# Patient Record
Sex: FEMALE | Race: BLACK OR AFRICAN AMERICAN | NOT HISPANIC OR LATINO | Employment: UNEMPLOYED | URBAN - METROPOLITAN AREA
[De-identification: names, ages, dates, MRNs, and addresses within clinical notes are randomized per-mention and may not be internally consistent; named-entity substitution may affect disease eponyms.]

---

## 2020-05-14 ENCOUNTER — HOSPITAL ENCOUNTER (EMERGENCY)
Facility: HOSPITAL | Age: 50
Discharge: HOME | End: 2020-05-14
Payer: COMMERCIAL

## 2020-05-14 VITALS
WEIGHT: 293 LBS | HEART RATE: 75 BPM | DIASTOLIC BLOOD PRESSURE: 82 MMHG | SYSTOLIC BLOOD PRESSURE: 115 MMHG | TEMPERATURE: 98 F | HEIGHT: 69 IN | BODY MASS INDEX: 43.4 KG/M2 | OXYGEN SATURATION: 100 % | RESPIRATION RATE: 16 BRPM

## 2020-05-14 DIAGNOSIS — M79.641 RIGHT HAND PAIN: Primary | ICD-10-CM

## 2020-05-14 DIAGNOSIS — R93.6 ABNORMAL X-RAY OF HAND: ICD-10-CM

## 2020-05-14 PROCEDURE — 63600175 PHARM REV CODE 636 W HCPCS: Performed by: NURSE PRACTITIONER

## 2020-05-14 PROCEDURE — 96372 THER/PROPH/DIAG INJ SC/IM: CPT | Mod: 59

## 2020-05-14 PROCEDURE — 29125 APPL SHORT ARM SPLINT STATIC: CPT

## 2020-05-14 PROCEDURE — 25000003 PHARM REV CODE 250: Performed by: NURSE PRACTITIONER

## 2020-05-14 PROCEDURE — 99284 EMERGENCY DEPT VISIT MOD MDM: CPT | Mod: 25

## 2020-05-14 RX ORDER — ONDANSETRON 4 MG/1
4 TABLET, ORALLY DISINTEGRATING ORAL
Status: COMPLETED | OUTPATIENT
Start: 2020-05-14 | End: 2020-05-14

## 2020-05-14 RX ORDER — TRAMADOL HYDROCHLORIDE 50 MG/1
50 TABLET ORAL
Status: COMPLETED | OUTPATIENT
Start: 2020-05-14 | End: 2020-05-14

## 2020-05-14 RX ORDER — MORPHINE SULFATE 4 MG/ML
4 INJECTION, SOLUTION INTRAMUSCULAR; INTRAVENOUS
Status: COMPLETED | OUTPATIENT
Start: 2020-05-14 | End: 2020-05-14

## 2020-05-14 RX ORDER — THYROID 30 MG/1
30 TABLET ORAL
COMMUNITY
Start: 2020-02-19

## 2020-05-14 RX ORDER — HYDROCODONE BITARTRATE AND ACETAMINOPHEN 10; 325 MG/1; MG/1
1 TABLET ORAL EVERY 6 HOURS PRN
Qty: 18 TABLET | Refills: 0 | OUTPATIENT
Start: 2020-05-14 | End: 2021-11-13

## 2020-05-14 RX ORDER — SPIRONOLACTONE 100 MG/1
100 TABLET, FILM COATED ORAL
COMMUNITY
Start: 2020-03-11 | End: 2021-03-11

## 2020-05-14 RX ADMIN — MORPHINE SULFATE 4 MG: 4 INJECTION INTRAVENOUS at 01:05

## 2020-05-14 RX ADMIN — TRAMADOL HYDROCHLORIDE 50 MG: 50 TABLET, FILM COATED ORAL at 12:05

## 2020-05-14 RX ADMIN — ONDANSETRON 4 MG: 4 TABLET, ORALLY DISINTEGRATING ORAL at 01:05

## 2020-05-14 NOTE — ED PROVIDER NOTES
HISTORY     Chief Complaint   Patient presents with    Hand Pain     R hand throbbing pain, started yesterday. Pt reports swelling and being unable to straighten hand completely      Review of patient's allergies indicates:  No Known Allergies     HPI   50 year old female presents to the ER for right hand pain. Pt reports the pain began yesterday and denies any injury to the hand. Pt reports the pain is a cramping pain and has generalized pain to the hand and fingers with ROM of the hand and wrist. Describes the pain as an aching pain. Denies any previous treatment. Pt reports several years ago she was diagnosed with carpal tunnel of the right hand. Denies any fever, chills, chest pain, shortness of breath, abdominal pain and all other symptoms     The history is provided by the patient. No  was used.        PCP: Primary Doctor No     Past Medical History:  Past Medical History:   Diagnosis Date    Thyroid disease         Past Surgical History:  Past Surgical History:   Procedure Laterality Date    BACK SURGERY      BREAST SURGERY       SECTION          Family History:  History reviewed. No pertinent family history.     Social History:  Social History     Tobacco Use    Smoking status: Never Smoker   Substance and Sexual Activity    Alcohol use: Never     Frequency: Never    Drug use: Not on file    Sexual activity: Not on file         ROS   Review of Systems   Constitutional: Negative for chills, fatigue and fever.   HENT: Negative for sore throat.    Respiratory: Negative for chest tightness and shortness of breath.    Cardiovascular: Negative for chest pain.   Gastrointestinal: Negative for abdominal pain and nausea.   Genitourinary: Negative for dysuria.   Musculoskeletal: Positive for arthralgias (right hand). Negative for back pain.   Skin: Negative for rash.   Neurological: Negative for dizziness and weakness.   Hematological: Does not bruise/bleed easily.        PHYSICAL EXAM     Initial Vitals [05/14/20 1208]   BP Pulse Resp Temp SpO2   (!) 140/92 92 18 98 °F (36.7 °C) 97 %      MAP       --           Physical Exam    Nursing note and vitals reviewed.  Constitutional: She appears well-developed and well-nourished. She is not diaphoretic. No distress.   HENT:   Head: Normocephalic and atraumatic.   Right Ear: External ear normal.   Left Ear: External ear normal.   Eyes: Conjunctivae are normal. Right eye exhibits no discharge. Left eye exhibits no discharge.   Neck: Normal range of motion. Neck supple.   Cardiovascular: Normal rate, regular rhythm and normal heart sounds.   Pulmonary/Chest: Breath sounds normal.   Abdominal: Soft. Bowel sounds are normal.   Musculoskeletal: Normal range of motion.   Right hand NVI. Radial and ulnar pulses 2+. Cap refill <2sec. Pain to the medial hand and fingers with compression of the medial nerve     Neurological: She is alert and oriented to person, place, and time. She has normal strength.   Skin: Skin is warm and dry.   Psychiatric: She has a normal mood and affect. Her behavior is normal. Thought content normal.          ED COURSE   Splint Application  Date/Time: 5/14/2020 2:01 PM  Performed by: Cortes Schafer NP  Authorized by: Cortes Schafer NP   Consent Done: Yes  Consent: Verbal consent obtained. Written consent not obtained.  Risks and benefits: risks, benefits and alternatives were discussed  Consent given by: patient  Patient understanding: patient states understanding of the procedure being performed  Patient consent: the patient's understanding of the procedure matches consent given  Procedure consent: procedure consent matches procedure scheduled  Patient identity confirmed: name  Location details: right hand  Splint type: thumb spica  Supplies used: Ortho-Glass,  cotton padding and elastic bandage  Post-procedure: The splinted body part was neurovascularly unchanged following the procedure.  Patient  "tolerance: Patient tolerated the procedure well with no immediate complications        ED ONGOING VITALS:  Vitals:    05/14/20 1208 05/14/20 1355   BP: (!) 140/92 (!) 145/71   Pulse: 92 81   Resp: 18 16   Temp: 98 °F (36.7 °C) 98.2 °F (36.8 °C)   TempSrc: Oral    SpO2: 97% 98%   Weight: 135.3 kg (298 lb 4.5 oz)    Height: 5' 8.5" (1.74 m)          ABNORMAL LAB VALUES:  Labs Reviewed - No data to display      ALL LAB VALUES:  none      RADIOLOGY STUDIES:  Imaging Results          X-Ray Hand 3 view Right (Final result)  Result time 05/14/20 13:30:18    Final result by CONCETTA Ann Sr., MD (05/14/20 13:30:18)                 Impression:      There is a 3 mm oval shaped osseous density adjacent to the proximal portion of the 1st metacarpal.  This is characteristic of a fracture fragment or an unfused accessory ossification center.      Electronically signed by: Guille Ann MD  Date:    05/14/2020  Time:    13:30             Narrative:    EXAMINATION:  XR HAND COMPLETE 3 VIEW RIGHT    CLINICAL HISTORY:  hand pain;    COMPARISON:  None    FINDINGS:  There is a 3 mm oval shaped osseous density adjacent to the proximal portion of the 1st metacarpal.  There is no dislocation.                                          The above vital signs and test results have been reviewed by the emergency provider.     ED Medications:  Medications   traMADoL tablet 50 mg (50 mg Oral Given 5/14/20 1231)   morphine injection 4 mg (4 mg Intramuscular Given 5/14/20 1345)   ondansetron disintegrating tablet 4 mg (4 mg Oral Given 5/14/20 1347)       There are no discharge medications for this patient.    Discharge Medications:  New Prescriptions    HYDROCODONE-ACETAMINOPHEN (NORCO)  MG PER TABLET    Take 1 tablet by mouth every 6 (six) hours as needed for Pain.      Follow-up Information     Schedule an appointment as soon as possible for a visit  with [facility].    Specialty:  Orthopedics  Contact information:  [id number] " [facility] [address]  [phone]                2:02 PM    I discussed with patient and/or family/caretaker that evaluation in the ED does not suggest any emergent or life threatening medical conditions requiring immediate intervention beyond what was provided in the ED, and I believe patient is safe for discharge. Regardless, an unremarkable evaluation in the ED does not preclude the development or presence of a serious or life threatening condition. As such, patient was instructed to return immediately for any worsening or change in current symptoms.    Pre-hypertension/Hypertension: The pt has been informed that they may have pre-hypertension or hypertension based on a blood pressure reading in the ED. I recommend that the pt call the PCP listed on their discharge instructions or a physician of their choice this week to arrange f/u for further evaluation of possible pre-hypertension or hypertension.       MEDICAL DECISION MAKING                 CLINICAL IMPRESSION       ICD-10-CM ICD-9-CM   1. Right hand pain M79.641 729.5   2. Abnormal x-ray of hand R93.6 793.7       Disposition:   Disposition: Discharged  Condition: Stable         Cortes Schafer NP  05/14/20 1402

## 2020-05-14 NOTE — ED NOTES
WILFRED Kolb notified of patient's increasing pain of 7/10. Patient states that she is close to tears because she is in so much pain.

## 2021-11-13 ENCOUNTER — HOSPITAL ENCOUNTER (EMERGENCY)
Facility: HOSPITAL | Age: 51
Discharge: HOME | End: 2021-11-13
Attending: FAMILY MEDICINE

## 2021-11-13 VITALS
OXYGEN SATURATION: 100 % | RESPIRATION RATE: 18 BRPM | TEMPERATURE: 98 F | WEIGHT: 293 LBS | HEART RATE: 114 BPM | DIASTOLIC BLOOD PRESSURE: 91 MMHG | SYSTOLIC BLOOD PRESSURE: 134 MMHG | HEIGHT: 67 IN | BODY MASS INDEX: 45.99 KG/M2

## 2021-11-13 DIAGNOSIS — R52 PAIN: ICD-10-CM

## 2021-11-13 DIAGNOSIS — M54.16 LUMBAR RADICULOPATHY: Primary | ICD-10-CM

## 2021-11-13 PROCEDURE — 25000003 PHARM REV CODE 250: Performed by: NURSE PRACTITIONER

## 2021-11-13 PROCEDURE — 99284 EMERGENCY DEPT VISIT MOD MDM: CPT | Mod: 25

## 2021-11-13 RX ORDER — KETOROLAC TROMETHAMINE 10 MG/1
10 TABLET, FILM COATED ORAL 3 TIMES DAILY
Qty: 15 TABLET | Refills: 0 | Status: SHIPPED | OUTPATIENT
Start: 2021-11-13 | End: 2021-11-18

## 2021-11-13 RX ORDER — CYCLOBENZAPRINE HCL 10 MG
10 TABLET ORAL
Status: COMPLETED | OUTPATIENT
Start: 2021-11-13 | End: 2021-11-13

## 2021-11-13 RX ORDER — CYCLOBENZAPRINE HCL 10 MG
10 TABLET ORAL 3 TIMES DAILY PRN
Qty: 15 TABLET | Refills: 0 | Status: SHIPPED | OUTPATIENT
Start: 2021-11-13 | End: 2021-11-18

## 2021-11-13 RX ORDER — KETOROLAC TROMETHAMINE 10 MG/1
10 TABLET, FILM COATED ORAL
Status: COMPLETED | OUTPATIENT
Start: 2021-11-13 | End: 2021-11-13

## 2021-11-13 RX ORDER — HYDROCODONE BITARTRATE AND ACETAMINOPHEN 10; 325 MG/1; MG/1
1 TABLET ORAL EVERY 4 HOURS PRN
Qty: 8 TABLET | Refills: 0 | Status: SHIPPED | OUTPATIENT
Start: 2021-11-13

## 2021-11-13 RX ORDER — HYDROCODONE BITARTRATE AND ACETAMINOPHEN 10; 325 MG/1; MG/1
1 TABLET ORAL
Status: COMPLETED | OUTPATIENT
Start: 2021-11-13 | End: 2021-11-13

## 2021-11-13 RX ADMIN — HYDROCODONE BITARTRATE AND ACETAMINOPHEN 1 TABLET: 10; 325 TABLET ORAL at 09:11

## 2021-11-13 RX ADMIN — KETOROLAC TROMETHAMINE 10 MG: 10 TABLET, FILM COATED ORAL at 09:11

## 2021-11-13 RX ADMIN — CYCLOBENZAPRINE HYDROCHLORIDE 10 MG: 10 TABLET, FILM COATED ORAL at 09:11

## 2023-03-27 PROBLEM — D68.52 PROTHROMBIN GENE MUTATION: Status: ACTIVE | Noted: 2019-12-30

## 2023-04-12 PROBLEM — Z80.3 FAMILY HISTORY OF MALIGNANT NEOPLASM OF BREAST: Status: ACTIVE | Noted: 2023-04-12

## 2023-05-10 PROBLEM — Z80.3 FAMILY HISTORY OF MALIGNANT NEOPLASM OF BREAST: Chronic | Status: ACTIVE | Noted: 2023-04-12

## 2023-08-25 ENCOUNTER — TELEPHONE (OUTPATIENT)
Dept: SURGERY | Facility: CLINIC | Age: 53
End: 2023-08-25
Payer: COMMERCIAL

## 2023-08-25 NOTE — TELEPHONE ENCOUNTER
"Spoke with patient about a right breast issue. She denied any pain or lumps but would just refer to it as a "spot" that she would like to have evaluated. Pt agreed to take 09/11 appointment with Miguel.  "

## 2023-09-10 PROBLEM — L98.8 SKIN LESION OF BREAST: Status: ACTIVE | Noted: 2023-09-10

## 2023-09-11 ENCOUNTER — OFFICE VISIT (OUTPATIENT)
Dept: SURGERY | Facility: CLINIC | Age: 53
End: 2023-09-11
Payer: COMMERCIAL

## 2023-09-11 DIAGNOSIS — Z80.3 FAMILY HISTORY OF MALIGNANT NEOPLASM OF BREAST: Chronic | ICD-10-CM

## 2023-09-11 DIAGNOSIS — N63.15 BREAST LUMP ON RIGHT SIDE AT 6 O'CLOCK POSITION: Primary | ICD-10-CM

## 2023-09-11 DIAGNOSIS — R92.1 BREAST CALCIFICATIONS ON MAMMOGRAM: ICD-10-CM

## 2023-09-11 PROBLEM — N63.10 BREAST MASS, RIGHT: Status: RESOLVED | Noted: 2023-09-11 | Resolved: 2023-09-11

## 2023-09-11 PROBLEM — N63.10 BREAST MASS, RIGHT: Status: ACTIVE | Noted: 2023-09-11

## 2023-09-11 PROBLEM — L98.8 SKIN LESION OF BREAST: Status: RESOLVED | Noted: 2023-09-10 | Resolved: 2023-09-11

## 2023-09-11 PROCEDURE — 1159F MED LIST DOCD IN RCRD: CPT | Mod: CPTII,S$GLB,, | Performed by: NURSE PRACTITIONER

## 2023-09-11 PROCEDURE — 1159F PR MEDICATION LIST DOCUMENTED IN MEDICAL RECORD: ICD-10-PCS | Mod: CPTII,S$GLB,, | Performed by: NURSE PRACTITIONER

## 2023-09-11 PROCEDURE — 99203 OFFICE O/P NEW LOW 30 MIN: CPT | Mod: S$GLB,,, | Performed by: NURSE PRACTITIONER

## 2023-09-11 PROCEDURE — 99203 PR OFFICE/OUTPT VISIT, NEW, LEVL III, 30-44 MIN: ICD-10-PCS | Mod: S$GLB,,, | Performed by: NURSE PRACTITIONER

## 2023-09-11 NOTE — ASSESSMENT & PLAN NOTE
We reviewed our findings today and her questions were answered.  She understands that her imaging and exams are consistent with a chunky piece of calcium (and show nothing concerning).  She is comfortable being followed in a conservative fashion.      She understands the importance of monthly self-breast examination and knows to report any and all changes as they occur.

## 2023-09-11 NOTE — ASSESSMENT & PLAN NOTE
We discussed her family history and how it could impact her own future risks.  We discussed family vs. genetic history and the importance and implications of each.  Genetic Counseling/Testing was offered, and all questions answered to her satisfaction.  She knows that as additional family members are diagnosed - she will need to let us know as this may change follow up and imaging recommendations. Long discussion with her about her family history and the increased risk that is attributable.  We discussed Genetic Counseling/Testing at length and information was given to address these.   Her Lifetime Risk is only 9.81% based on the [employer] Tool. The population risk is 9.3%- so she not at a very high risk.   Her family and personal history were confirmed, and I believe this risk to be true based on its limitations.  She knows that there are certain elements that increase/decrease her risk that are not amendable to .  This risk is less than 20% and does not meet the threshold for additional annual imaging with MRI.  She will also not need to be followed in the High-Risk setting and can be seen on an annual basis.    We had a discussion concerning Breast Cancer Risk Reduction and current NCCN Guidelines. She knows that her risk can be lowered slightly with a healthy lifestyle and minimal ETOH use. Being physically active will also help. She should reduce or stay away from OCPs and HRT as possible.

## 2023-09-11 NOTE — PROGRESS NOTES
"  [facility]  MD Miguel Iniguez, NP-C    Chief Complaint:   Ct Hidalgo is a 53 y.o. female presenting today for a right breast mass that she first noticed two months ago. She denies any redness or tenderness. Her mammogram in 2023 was normal with NEM. She also presents due to her family history of breast cancer.     History of Present Illness:   Mrs. Hidalgo presents on September 10, 2023 due to a "spot" on her right breast. Her mammogram has remained normal. She also presents due to her family history or breast cancer. MD:::: Kera Monroy MD.    Past Medical History:   Diagnosis Date    Breast calcifications on mammogram 2023    Breast lump on right side at 6 o'clock position 2023    Breast mass, right 2023    Family history of malignant neoplasm of breast 2023    Fibromyalgia     Hashimoto's disease     Prothrombin gene mutation     Thyroid disease       Past Surgical History:   Procedure Laterality Date    BACK SURGERY      breast lift  and bilateral reduction  2017     SECTION      x 4    DILATION AND CURETTAGE OF UTERUS      ENDOMETRIAL ABLATION      REDUCTION OF BOTH BREASTS      Tummy tuck          Current Outpatient Medications:     DULoxetine (CYMBALTA) 60 MG capsule, TAKE 1 CAPSULE BY MOUTH IN THE MORNING. FOLLOW UP, Disp: , Rfl:     HYDROcodone-acetaminophen (NORCO)  mg per tablet, Take 1 tablet by mouth every 4 (four) hours as needed for Pain., Disp: 8 tablet, Rfl: 0    semaglutide (OZEMPIC) 0.25 mg or 0.5 mg(2 mg/1.5 mL) pen injector, Inject 0.5 mg into the skin once a week., Disp: , Rfl:     thyroid, pork, (ARMOUR THYROID) 30 mg Tab, Take 30 mg by mouth., Disp: , Rfl:     VYVANSE 40 mg Cap, , Disp: , Rfl:     spironolactone (ALDACTONE) 100 MG tablet, Take 100 mg by mouth., Disp: , Rfl:    Review of patient's allergies indicates:  No Known Allergies   Social History     Tobacco Use    Smoking status: " Never    Smokeless tobacco: Never   Substance Use Topics    Alcohol use: Never      Family History   Problem Relation Age of Onset    Bone cancer Maternal Grandmother     Lung cancer Maternal Grandfather     Breast cancer Maternal Aunt 40    Ovarian cancer Other         Review of Systems   Integumentary:  Positive for breast mass.   Breast: Positive for mass.       Physical Exam   Constitutional: She appears well-developed. She is cooperative.   HENT:   Head: Normocephalic.   Cardiovascular:  Normal rate and regular rhythm.            Pulmonary/Chest: She exhibits no tenderness and no bony tenderness. Right breast exhibits no mass (subcentimeter nodule noted in the 6 o'clock positoin; lateral to her incision.), no nipple discharge, no skin change and no tenderness. Left breast exhibits no mass, no nipple discharge, no skin change and no tenderness.       Abdominal: Soft. Normal appearance.   Musculoskeletal: Lymphadenopathy:      Upper Body:      Right upper body: No supraclavicular or axillary adenopathy.      Left upper body: No supraclavicular or axillary adenopathy.     Neurological: She is alert.   Skin: No rash noted.        Mammogram: Screening 3/27/23- The breasts have scattered areas of fibroglandular density. There is no evidence of suspicious masses, calcifications, or other abnormal findings. There is no mammographic evidence of malignancy.      Ultrasound: Right Today- Ultrasound demonstrates a 4 mm calcification in the region of the patient's scar involving the skin with posterior shadowing.  This corresponds to patient's palpable abnormality.  No suspicious findings identified. There is no sonographic evidence of malignancy in the right breast.    Assessment/Plan  1. Breast lump on right side at 6 o'clock position  Assessment & Plan:  We reviewed our findings today and her questions were answered.  She understands that her imaging and exams are consistent with a chunky piece of calcium (and show  nothing concerning).  She is comfortable being followed in a conservative fashion.      She understands the importance of monthly self-breast examination and knows to report any and all changes as they occur.      Orders:  -     Cancel: US Breast Right Limited; Future; Expected date: 09/11/2023    2. Breast calcifications on mammogram  Assessment & Plan:  Same as above      3. Family history of malignant neoplasm of breast  Assessment & Plan:  We discussed her family history and how it could impact her own future risks.  We discussed family vs. genetic history and the importance and implications of each.  Genetic Counseling/Testing was offered, and all questions answered to her satisfaction.  She knows that as additional family members are diagnosed - she will need to let us know as this may change follow up and imaging recommendations. Long discussion with her about her family history and the increased risk that is attributable.  We discussed Genetic Counseling/Testing at length and information was given to address these.   Her Lifetime Risk is only 9.81% based on the [employer] Tool. The population risk is 9.3%- so she not at a very high risk.   Her family and personal history were confirmed, and I believe this risk to be true based on its limitations.  She knows that there are certain elements that increase/decrease her risk that are not amendable to .  This risk is less than 20% and does not meet the threshold for additional annual imaging with MRI.  She will also not need to be followed in the High-Risk setting and can be seen on an annual basis.    We had a discussion concerning Breast Cancer Risk Reduction and current NCCN Guidelines. She knows that her risk can be lowered slightly with a healthy lifestyle and minimal ETOH use. Being physically active will also help. She should reduce or stay away from OCPs and HRT as possible.                Medical Decision Making:  It is my impression that  this patient suffers all conditions contained in this medical document.  Each of these conditions did affect our plan of care and my medical decision making today.  It is my opinion that the medical decision making concerning this patient was of moderate difficulty based on the aforementioned conditions.  Any further recommendations will be communicated to the patient by me.  I have reviewed and verified her allergies, list of medications, medical and surgical histories, social history, and a pertinent review of symptoms.      Follow up:  6 months and prn    For:  [facility] (S) at [facility]

## 2024-03-04 DIAGNOSIS — Z12.31 OTHER SCREENING MAMMOGRAM: Primary | ICD-10-CM

## 2024-06-14 DIAGNOSIS — Z12.31 OTHER SCREENING MAMMOGRAM: Primary | ICD-10-CM
